# Patient Record
Sex: MALE | Race: OTHER | HISPANIC OR LATINO
[De-identification: names, ages, dates, MRNs, and addresses within clinical notes are randomized per-mention and may not be internally consistent; named-entity substitution may affect disease eponyms.]

---

## 2020-10-08 ENCOUNTER — TRANSCRIPTION ENCOUNTER (OUTPATIENT)
Age: 25
End: 2020-10-08

## 2020-10-09 ENCOUNTER — RESULT REVIEW (OUTPATIENT)
Age: 25
End: 2020-10-09

## 2020-10-09 ENCOUNTER — INPATIENT (INPATIENT)
Facility: HOSPITAL | Age: 25
LOS: 0 days | Discharge: HOME | End: 2020-10-10
Attending: SURGERY | Admitting: SURGERY
Payer: MEDICAID

## 2020-10-09 VITALS
HEIGHT: 67 IN | RESPIRATION RATE: 20 BRPM | OXYGEN SATURATION: 97 % | HEART RATE: 104 BPM | DIASTOLIC BLOOD PRESSURE: 84 MMHG | TEMPERATURE: 98 F | SYSTOLIC BLOOD PRESSURE: 147 MMHG | WEIGHT: 169.98 LBS

## 2020-10-09 DIAGNOSIS — E87.6 HYPOKALEMIA: ICD-10-CM

## 2020-10-09 DIAGNOSIS — K35.30 ACUTE APPENDICITIS WITH LOCALIZED PERITONITIS, WITHOUT PERFORATION OR GANGRENE: ICD-10-CM

## 2020-10-09 LAB
ALBUMIN SERPL ELPH-MCNC: 3.8 G/DL — SIGNIFICANT CHANGE UP (ref 3.3–5)
ALP SERPL-CCNC: 75 U/L — SIGNIFICANT CHANGE UP (ref 40–120)
ALT FLD-CCNC: 41 U/L — SIGNIFICANT CHANGE UP (ref 12–78)
ANION GAP SERPL CALC-SCNC: 7 MMOL/L — SIGNIFICANT CHANGE UP (ref 5–17)
APPEARANCE UR: CLEAR — SIGNIFICANT CHANGE UP
APTT BLD: 32.4 SEC — SIGNIFICANT CHANGE UP (ref 27.5–35.5)
AST SERPL-CCNC: 28 U/L — SIGNIFICANT CHANGE UP (ref 15–37)
BASOPHILS # BLD AUTO: 0.03 K/UL — SIGNIFICANT CHANGE UP (ref 0–0.2)
BASOPHILS NFR BLD AUTO: 0.3 % — SIGNIFICANT CHANGE UP (ref 0–2)
BILIRUB SERPL-MCNC: 0.4 MG/DL — SIGNIFICANT CHANGE UP (ref 0.2–1.2)
BILIRUB UR-MCNC: NEGATIVE — SIGNIFICANT CHANGE UP
BLD GP AB SCN SERPL QL: SIGNIFICANT CHANGE UP
BUN SERPL-MCNC: 13 MG/DL — SIGNIFICANT CHANGE UP (ref 7–23)
CALCIUM SERPL-MCNC: 8.8 MG/DL — SIGNIFICANT CHANGE UP (ref 8.5–10.1)
CHLORIDE SERPL-SCNC: 107 MMOL/L — SIGNIFICANT CHANGE UP (ref 96–108)
CO2 SERPL-SCNC: 26 MMOL/L — SIGNIFICANT CHANGE UP (ref 22–31)
COLOR SPEC: YELLOW — SIGNIFICANT CHANGE UP
CREAT SERPL-MCNC: 1.1 MG/DL — SIGNIFICANT CHANGE UP (ref 0.5–1.3)
DIFF PNL FLD: NEGATIVE — SIGNIFICANT CHANGE UP
EOSINOPHIL # BLD AUTO: 0.49 K/UL — SIGNIFICANT CHANGE UP (ref 0–0.5)
EOSINOPHIL NFR BLD AUTO: 4.1 % — SIGNIFICANT CHANGE UP (ref 0–6)
GLUCOSE SERPL-MCNC: 122 MG/DL — HIGH (ref 70–99)
GLUCOSE UR QL: NEGATIVE MG/DL — SIGNIFICANT CHANGE UP
HCT VFR BLD CALC: 47.4 % — SIGNIFICANT CHANGE UP (ref 39–50)
HGB BLD-MCNC: 16.4 G/DL — SIGNIFICANT CHANGE UP (ref 13–17)
IMM GRANULOCYTES NFR BLD AUTO: 0.3 % — SIGNIFICANT CHANGE UP (ref 0–1.5)
INR BLD: 1.36 RATIO — HIGH (ref 0.88–1.16)
KETONES UR-MCNC: NEGATIVE — SIGNIFICANT CHANGE UP
LEUKOCYTE ESTERASE UR-ACNC: NEGATIVE — SIGNIFICANT CHANGE UP
LIDOCAIN IGE QN: 128 U/L — SIGNIFICANT CHANGE UP (ref 73–393)
LYMPHOCYTES # BLD AUTO: 1.22 K/UL — SIGNIFICANT CHANGE UP (ref 1–3.3)
LYMPHOCYTES # BLD AUTO: 10.3 % — LOW (ref 13–44)
MCHC RBC-ENTMCNC: 30.1 PG — SIGNIFICANT CHANGE UP (ref 27–34)
MCHC RBC-ENTMCNC: 34.6 GM/DL — SIGNIFICANT CHANGE UP (ref 32–36)
MCV RBC AUTO: 87.1 FL — SIGNIFICANT CHANGE UP (ref 80–100)
MONOCYTES # BLD AUTO: 0.76 K/UL — SIGNIFICANT CHANGE UP (ref 0–0.9)
MONOCYTES NFR BLD AUTO: 6.4 % — SIGNIFICANT CHANGE UP (ref 2–14)
NEUTROPHILS # BLD AUTO: 9.35 K/UL — HIGH (ref 1.8–7.4)
NEUTROPHILS NFR BLD AUTO: 78.6 % — HIGH (ref 43–77)
NITRITE UR-MCNC: NEGATIVE — SIGNIFICANT CHANGE UP
NRBC # BLD: 0 /100 WBCS — SIGNIFICANT CHANGE UP (ref 0–0)
PH UR: 6.5 — SIGNIFICANT CHANGE UP (ref 5–8)
PLATELET # BLD AUTO: 269 K/UL — SIGNIFICANT CHANGE UP (ref 150–400)
POTASSIUM SERPL-MCNC: 3.4 MMOL/L — LOW (ref 3.5–5.3)
POTASSIUM SERPL-SCNC: 3.4 MMOL/L — LOW (ref 3.5–5.3)
PROT SERPL-MCNC: 7 GM/DL — SIGNIFICANT CHANGE UP (ref 6–8.3)
PROT UR-MCNC: NEGATIVE MG/DL — SIGNIFICANT CHANGE UP
PROTHROM AB SERPL-ACNC: 15.5 SEC — HIGH (ref 10.6–13.6)
RBC # BLD: 5.44 M/UL — SIGNIFICANT CHANGE UP (ref 4.2–5.8)
RBC # FLD: 12.3 % — SIGNIFICANT CHANGE UP (ref 10.3–14.5)
SARS-COV-2 RNA SPEC QL NAA+PROBE: SIGNIFICANT CHANGE UP
SODIUM SERPL-SCNC: 140 MMOL/L — SIGNIFICANT CHANGE UP (ref 135–145)
SP GR SPEC: 1.01 — SIGNIFICANT CHANGE UP (ref 1.01–1.02)
UROBILINOGEN FLD QL: NEGATIVE MG/DL — SIGNIFICANT CHANGE UP
WBC # BLD: 11.88 K/UL — HIGH (ref 3.8–10.5)
WBC # FLD AUTO: 11.88 K/UL — HIGH (ref 3.8–10.5)

## 2020-10-09 PROCEDURE — 74177 CT ABD & PELVIS W/CONTRAST: CPT | Mod: 26,ME

## 2020-10-09 PROCEDURE — 99284 EMERGENCY DEPT VISIT MOD MDM: CPT

## 2020-10-09 PROCEDURE — G1004: CPT

## 2020-10-09 PROCEDURE — 99222 1ST HOSP IP/OBS MODERATE 55: CPT | Mod: 57

## 2020-10-09 PROCEDURE — 93010 ELECTROCARDIOGRAM REPORT: CPT

## 2020-10-09 PROCEDURE — 88304 TISSUE EXAM BY PATHOLOGIST: CPT | Mod: 26

## 2020-10-09 PROCEDURE — 71045 X-RAY EXAM CHEST 1 VIEW: CPT | Mod: 26

## 2020-10-09 PROCEDURE — 44970 LAPAROSCOPY APPENDECTOMY: CPT | Mod: AS

## 2020-10-09 PROCEDURE — 44970 LAPAROSCOPY APPENDECTOMY: CPT

## 2020-10-09 RX ORDER — SODIUM CHLORIDE 9 MG/ML
1000 INJECTION, SOLUTION INTRAVENOUS
Refills: 0 | Status: DISCONTINUED | OUTPATIENT
Start: 2020-10-09 | End: 2020-10-09

## 2020-10-09 RX ORDER — SODIUM CHLORIDE 9 MG/ML
1000 INJECTION, SOLUTION INTRAVENOUS
Refills: 0 | Status: DISCONTINUED | OUTPATIENT
Start: 2020-10-09 | End: 2020-10-10

## 2020-10-09 RX ORDER — POTASSIUM CHLORIDE 20 MEQ
10 PACKET (EA) ORAL ONCE
Refills: 0 | Status: COMPLETED | OUTPATIENT
Start: 2020-10-09 | End: 2020-10-09

## 2020-10-09 RX ORDER — MORPHINE SULFATE 50 MG/1
4 CAPSULE, EXTENDED RELEASE ORAL ONCE
Refills: 0 | Status: DISCONTINUED | OUTPATIENT
Start: 2020-10-09 | End: 2020-10-09

## 2020-10-09 RX ORDER — ONDANSETRON 8 MG/1
4 TABLET, FILM COATED ORAL ONCE
Refills: 0 | Status: COMPLETED | OUTPATIENT
Start: 2020-10-09 | End: 2020-10-09

## 2020-10-09 RX ORDER — HEPARIN SODIUM 5000 [USP'U]/ML
5000 INJECTION INTRAVENOUS; SUBCUTANEOUS EVERY 12 HOURS
Refills: 0 | Status: DISCONTINUED | OUTPATIENT
Start: 2020-10-09 | End: 2020-10-09

## 2020-10-09 RX ORDER — IOHEXOL 300 MG/ML
30 INJECTION, SOLUTION INTRAVENOUS ONCE
Refills: 0 | Status: COMPLETED | OUTPATIENT
Start: 2020-10-09 | End: 2020-10-09

## 2020-10-09 RX ORDER — HYDROMORPHONE HYDROCHLORIDE 2 MG/ML
0.5 INJECTION INTRAMUSCULAR; INTRAVENOUS; SUBCUTANEOUS
Refills: 0 | Status: DISCONTINUED | OUTPATIENT
Start: 2020-10-09 | End: 2020-10-09

## 2020-10-09 RX ORDER — MORPHINE SULFATE 50 MG/1
2 CAPSULE, EXTENDED RELEASE ORAL EVERY 6 HOURS
Refills: 0 | Status: DISCONTINUED | OUTPATIENT
Start: 2020-10-09 | End: 2020-10-10

## 2020-10-09 RX ORDER — ONDANSETRON 8 MG/1
4 TABLET, FILM COATED ORAL EVERY 6 HOURS
Refills: 0 | Status: DISCONTINUED | OUTPATIENT
Start: 2020-10-09 | End: 2020-10-10

## 2020-10-09 RX ORDER — ACETAMINOPHEN 500 MG
1000 TABLET ORAL ONCE
Refills: 0 | Status: COMPLETED | OUTPATIENT
Start: 2020-10-09 | End: 2020-10-09

## 2020-10-09 RX ORDER — OXYCODONE AND ACETAMINOPHEN 5; 325 MG/1; MG/1
1 TABLET ORAL EVERY 6 HOURS
Refills: 0 | Status: DISCONTINUED | OUTPATIENT
Start: 2020-10-09 | End: 2020-10-10

## 2020-10-09 RX ORDER — SODIUM CHLORIDE 9 MG/ML
1000 INJECTION INTRAMUSCULAR; INTRAVENOUS; SUBCUTANEOUS ONCE
Refills: 0 | Status: COMPLETED | OUTPATIENT
Start: 2020-10-09 | End: 2020-10-09

## 2020-10-09 RX ORDER — HEPARIN SODIUM 5000 [USP'U]/ML
5000 INJECTION INTRAVENOUS; SUBCUTANEOUS EVERY 12 HOURS
Refills: 0 | Status: DISCONTINUED | OUTPATIENT
Start: 2020-10-09 | End: 2020-10-10

## 2020-10-09 RX ORDER — HYDROMORPHONE HYDROCHLORIDE 2 MG/ML
1 INJECTION INTRAMUSCULAR; INTRAVENOUS; SUBCUTANEOUS
Refills: 0 | Status: DISCONTINUED | OUTPATIENT
Start: 2020-10-09 | End: 2020-10-09

## 2020-10-09 RX ORDER — PIPERACILLIN AND TAZOBACTAM 4; .5 G/20ML; G/20ML
3.38 INJECTION, POWDER, LYOPHILIZED, FOR SOLUTION INTRAVENOUS ONCE
Refills: 0 | Status: COMPLETED | OUTPATIENT
Start: 2020-10-09 | End: 2020-10-09

## 2020-10-09 RX ORDER — ONDANSETRON 8 MG/1
4 TABLET, FILM COATED ORAL ONCE
Refills: 0 | Status: DISCONTINUED | OUTPATIENT
Start: 2020-10-09 | End: 2020-10-09

## 2020-10-09 RX ADMIN — HYDROMORPHONE HYDROCHLORIDE 0.5 MILLIGRAM(S): 2 INJECTION INTRAMUSCULAR; INTRAVENOUS; SUBCUTANEOUS at 23:20

## 2020-10-09 RX ADMIN — MORPHINE SULFATE 4 MILLIGRAM(S): 50 CAPSULE, EXTENDED RELEASE ORAL at 14:11

## 2020-10-09 RX ADMIN — HYDROMORPHONE HYDROCHLORIDE 0.5 MILLIGRAM(S): 2 INJECTION INTRAMUSCULAR; INTRAVENOUS; SUBCUTANEOUS at 23:14

## 2020-10-09 RX ADMIN — SODIUM CHLORIDE 100 MILLILITER(S): 9 INJECTION, SOLUTION INTRAVENOUS at 22:44

## 2020-10-09 RX ADMIN — SODIUM CHLORIDE 125 MILLILITER(S): 9 INJECTION, SOLUTION INTRAVENOUS at 18:25

## 2020-10-09 RX ADMIN — Medication 1000 MILLIGRAM(S): at 23:00

## 2020-10-09 RX ADMIN — HYDROMORPHONE HYDROCHLORIDE 0.5 MILLIGRAM(S): 2 INJECTION INTRAMUSCULAR; INTRAVENOUS; SUBCUTANEOUS at 22:59

## 2020-10-09 RX ADMIN — MORPHINE SULFATE 4 MILLIGRAM(S): 50 CAPSULE, EXTENDED RELEASE ORAL at 18:25

## 2020-10-09 RX ADMIN — HYDROMORPHONE HYDROCHLORIDE 0.5 MILLIGRAM(S): 2 INJECTION INTRAMUSCULAR; INTRAVENOUS; SUBCUTANEOUS at 23:35

## 2020-10-09 RX ADMIN — ONDANSETRON 4 MILLIGRAM(S): 8 TABLET, FILM COATED ORAL at 14:11

## 2020-10-09 RX ADMIN — PIPERACILLIN AND TAZOBACTAM 200 GRAM(S): 4; .5 INJECTION, POWDER, LYOPHILIZED, FOR SOLUTION INTRAVENOUS at 18:24

## 2020-10-09 RX ADMIN — Medication 100 MILLIEQUIVALENT(S): at 18:50

## 2020-10-09 RX ADMIN — IOHEXOL 30 MILLILITER(S): 300 INJECTION, SOLUTION INTRAVENOUS at 14:11

## 2020-10-09 RX ADMIN — MORPHINE SULFATE 4 MILLIGRAM(S): 50 CAPSULE, EXTENDED RELEASE ORAL at 14:30

## 2020-10-09 RX ADMIN — SODIUM CHLORIDE 1000 MILLILITER(S): 9 INJECTION INTRAMUSCULAR; INTRAVENOUS; SUBCUTANEOUS at 14:11

## 2020-10-09 RX ADMIN — Medication 400 MILLIGRAM(S): at 22:44

## 2020-10-09 RX ADMIN — MORPHINE SULFATE 4 MILLIGRAM(S): 50 CAPSULE, EXTENDED RELEASE ORAL at 18:50

## 2020-10-09 NOTE — H&P ADULT - NSHPPHYSICALEXAM_GEN_ALL_CORE
Vital Signs Last 24 Hrs  T(C): 36.7 (09 Oct 2020 17:07), Max: 36.8 (09 Oct 2020 13:17)  T(F): 98 (09 Oct 2020 17:07), Max: 98.3 (09 Oct 2020 13:17)  HR: 93 (09 Oct 2020 17:07) (93 - 104)  BP: 127/72 (09 Oct 2020 17:07) (127/72 - 147/84)  BP(mean): --  RR: 18 (09 Oct 2020 17:07) (18 - 20)  SpO2: 98% (09 Oct 2020 17:07) (97% - 98%)    PHYSICAL EXAM:  Constitutional: WDWN male in NAD  Eyes: AICHA  ENMT: NC/AT- throat pink  Neck: Supple no masses  Back: no CVA T  Respiratory: CTA  Cardiovascular: RR S1S2 nl no M  Gastrointestinal: No scars- BS ++ soft with ROVSINGS + and RLQ tenderness with guarding and rebound   Extremities: no CCE

## 2020-10-09 NOTE — ED ADULT NURSE NOTE - NSIMPLEMENTINTERV_GEN_ALL_ED
Implemented All Universal Safety Interventions:  [place] to call system. Call bell, personal items and telephone within reach. Instruct patient to call for assistance. Room bathroom lighting operational. Non-slip footwear when patient is off stretcher. Physically safe environment: no spills, clutter or unnecessary equipment. Stretcher in lowest position, wheels locked, appropriate side rails in place.

## 2020-10-09 NOTE — ED ADULT TRIAGE NOTE - CHIEF COMPLAINT QUOTE
Pt c/o worsening abd pain today, has been getting worse x 1 month. Hx of this x 1 year, had an endoscopy, was given pills but ran out.

## 2020-10-09 NOTE — H&P ADULT - NSHPLABSRESULTS_GEN_ALL_CORE
LABS:                       16.4   11.88 )-----------( 269      ( 09 Oct 2020 14:36 )             47.4     10-09    140  |  107  |  13  ----------------------------<  122<H>  3.4<L>   |  26  |  1.10    Ca    8.8      09 Oct 2020 14:36  TPro  7.0  /  Alb  3.8  /  TBili  0.4  /  DBili  x   /  AST  28  /  ALT  41  /  AlkPhos  75  10-09  < from: CT Abdomen and Pelvis w/ Oral Cont and w/ IV Cont (10.09.20 @ 16:34) >    EXAM:  CT ABDOMEN AND PELVIS OC IC                            PROCEDURE DATE:  10/09/2020          INTERPRETATION:  CLINICAL INFORMATION: Right lower quadrant abdominal pain.    COMPARISON: None.    PROCEDURE:  CT of the Abdomen and Pelvis was performed with intravenous contrast.  Intravenous contrast: 90 ml Omnipaque 350. 10 ml discarded.  Oral contrast: positive contrast was administered.  Sagittal and coronal reformats were performed.    FINDINGS: A fluid distended appendix demonstrating mild wall thickening identified. An appendicolith is identified. No significant infiltration of the periappendiceal fat.    LOWER CHEST: Within normal limits.    LIVER: Within normal limits.  BILE DUCTS: Normal caliber.  GALLBLADDER: Within normal limits.  SPLEEN: Within normal limits.  PANCREAS: Within normal limits.  ADRENALS: Within normal limits.  KIDNEYS/URETERS: Within normal limits.    BLADDER: Within normal limits.  REPRODUCTIVE ORGANS: Prostate within normal limits.    BOWEL: Fecal material scattered throughout the colon. No evidence for mechanical bowel obstruction. No colonic wall thickening.  PERITONEUM: No free intraperitoneal air or fluid.  VESSELS: Within normal limits.  RETROPERITONEUM/LYMPH NODES: No lymphadenopathy.  ABDOMINAL WALL: Within normal limits.  BONES: No osseous fracture. Sclerotic focus right femoral head, likely bone island.    IMPRESSION:  A fluid distended appendix demonstrating mild wall thickening identified. An appendicolith is identified. Findings most consistent with early acute appendicitis.                KENNY JARQUIN M.D., ATTENDING RADIOLOGIST  This document has been electronically signed. Oct  9 2020  4:41PM    < end of copied text >

## 2020-10-09 NOTE — ED ADULT NURSE NOTE - CHPI ED NUR SYMPTOMS NEG
no chills/no dysuria/no diarrhea/no blood in stool/no burning urination/no hematuria/no abdominal distension/no fever

## 2020-10-09 NOTE — H&P ADULT - PROBLEM SELECTOR PLAN 1
OR- Laparoscopic appendectomy  IV FLuids, OF antibiotics, Rapid COVID pre op   Consent on chart   Clears and ADAT post op OR- Laparoscopic appendectomy  IV FLuids, IV antibiotics, Rapid COVID pre op   Consent on chart   Clears and ADAT post op

## 2020-10-09 NOTE — ED ADULT NURSE NOTE - PAIN: BODY LOCATION
Don't put cereal in bottle unless instructed by a doctor, feed to baby on a spoon.    Start one new food every 3 days.    Next visit is at 6 months.    In the next few weeks you may receive a [employer] Ganey survey or email regarding your most recent clinic visit with us.  Please take a few moments to accurately evaluate your visit. We strive to provide you with the best medical care. Your feedback will assist us in achieving this. Again, thank you for your time and we look forward to your next visit.       If we need to contact you regarding any test results, we will make 2 attempts to reach you at the number you have listed during your office visit today. If we are unable to reach you, a letter will be sent requesting you to contact the office and any further instructions will be mailed to you home.           abdomen

## 2020-10-09 NOTE — ED PROVIDER NOTE - PHYSICAL EXAMINATION
Gen: Alert, Well appearing. NAD    Head: NC, AT, PERRL, normal lids/conjunctiva   ENT: Bilateral TM WNL, patent oropharynx without erythema/exudate, uvula midline  Neck: supple, no tenderness/meningismus  Pulm: Bilateral clear BS, normal resp effort  CV: RRR, no M/R/G, +dist pulses   Abd: soft, RLQ tender, ND, +BS, no guarding/rebound tenderness  Mskel: no edema/erythema/cyanosis   Skin: no rash, no bruising  Neuro: AAOx3, no sensory/motor deficits, CN 2-12 intact

## 2020-10-09 NOTE — BRIEF OPERATIVE NOTE - NSICDXBRIEFPREOP_GEN_ALL_CORE_FT
PRE-OP DIAGNOSIS:  Acute appendicitis with localized peritonitis 09-Oct-2020 22:55:22  Cheryl Kirk

## 2020-10-09 NOTE — ED PROVIDER NOTE - OBJECTIVE STATEMENT
25yo male with pmh gastritis not on meds, presents with RLQ pain today with NV. denies hematuria, diarrhea, constipation, fever.     No fever/chills, No photophobia/eye pain/changes in vision, No ear pain/sore throat/dysphagia, No chest pain/palpitations, no SOB/cough, no wheeze/stridor, +abdominal pain, +N/V, no D, no dysuria/frequency/discharge, No neck/back pain, no rash, no changes in neurological status/function.

## 2020-10-09 NOTE — ED ADULT NURSE NOTE - OBJECTIVE STATEMENT
Pt AOx4, ambulatory, c/o abdominal pain x 1 year, feeling worst since yesterday. Pt denies hematuria, dysuria, recent travel, CP, SOB, cough, and/or sick contacts. No distress noted.

## 2020-10-09 NOTE — PRE-OP CHECKLIST - COMMENTS
Patients last meal was at 1000 this am. Oatmeal and a sandwich. Information received via  service. Jose # [id number]

## 2020-10-09 NOTE — H&P ADULT - HISTORY OF PRESENT ILLNESS
Pt is a 25 yo male with no significant PMH presented to the ED with several hours of RLQ pain 7/10 non radiating. Denies fever nausea or vomiting. Nl BM and passing flatus.  Last ate ~1030 am tolerated diet.     PMH- Gastritis  Meds- "for gastritis" does not know name  Allergies- NKDA  Soc last smoked cigarettes 1.5 years ago  No ETOH    ER MEDICATIONS  (STANDING):  heparin   Injectable 5000 Unit(s) SubCutaneous every 12 hours  lactated ringers. 1000 milliLiter(s) (125 mL/Hr) IV Continuous <Continuous>  morphine  - Injectable 4 milliGRAM(s) IV Push Once  piperacillin/tazobactam IVPB... 3.375 Gram(s) IV Intermittent once  potassium chloride  10 mEq/100 mL IVPB 10 milliEquivalent(s) IV Intermittent once    MEDICATIONS  (PRN):

## 2020-10-09 NOTE — ED PROVIDER NOTE - CADM POA CENTRAL LINE
[facility] Emergency Department  [address] [address]  Phone:  [phone]  Fax:  [phone]                                    Kwabena Richards   MRN: [id number]    Department:  [facility] Emergency Department   Date of Visit:  8/17/2020           After Visit Summary Signature Page    I have received my discharge instructions, and my questions have been answered. I have discussed any challenges I see with this plan with the nurse or doctor.    ..........................................................................................................................................  Patient/Patient Representative Signature      ..........................................................................................................................................  Patient Representative Print Name and Relationship to Patient    ..................................................               ................................................  Date                                   Time    ..........................................................................................................................................  Reviewed by Signature/Title    ...................................................              ..............................................  Date                                               Time          22EPIC Rev 08/18       
No

## 2020-10-10 ENCOUNTER — TRANSCRIPTION ENCOUNTER (OUTPATIENT)
Age: 25
End: 2020-10-10

## 2020-10-10 VITALS
HEART RATE: 80 BPM | TEMPERATURE: 98 F | OXYGEN SATURATION: 100 % | RESPIRATION RATE: 16 BRPM | SYSTOLIC BLOOD PRESSURE: 132 MMHG | DIASTOLIC BLOOD PRESSURE: 76 MMHG

## 2020-10-10 LAB
ANION GAP SERPL CALC-SCNC: 6 MMOL/L — SIGNIFICANT CHANGE UP (ref 5–17)
BASOPHILS # BLD AUTO: 0.03 K/UL — SIGNIFICANT CHANGE UP (ref 0–0.2)
BASOPHILS NFR BLD AUTO: 0.4 % — SIGNIFICANT CHANGE UP (ref 0–2)
BUN SERPL-MCNC: 7 MG/DL — SIGNIFICANT CHANGE UP (ref 7–23)
CALCIUM SERPL-MCNC: 8.8 MG/DL — SIGNIFICANT CHANGE UP (ref 8.5–10.1)
CHLORIDE SERPL-SCNC: 107 MMOL/L — SIGNIFICANT CHANGE UP (ref 96–108)
CO2 SERPL-SCNC: 27 MMOL/L — SIGNIFICANT CHANGE UP (ref 22–31)
CREAT SERPL-MCNC: 0.92 MG/DL — SIGNIFICANT CHANGE UP (ref 0.5–1.3)
CULTURE RESULTS: NO GROWTH — SIGNIFICANT CHANGE UP
EOSINOPHIL # BLD AUTO: 0.37 K/UL — SIGNIFICANT CHANGE UP (ref 0–0.5)
EOSINOPHIL NFR BLD AUTO: 5.5 % — SIGNIFICANT CHANGE UP (ref 0–6)
GLUCOSE SERPL-MCNC: 95 MG/DL — SIGNIFICANT CHANGE UP (ref 70–99)
HCT VFR BLD CALC: 46.7 % — SIGNIFICANT CHANGE UP (ref 39–50)
HGB BLD-MCNC: 16.2 G/DL — SIGNIFICANT CHANGE UP (ref 13–17)
IMM GRANULOCYTES NFR BLD AUTO: 0.3 % — SIGNIFICANT CHANGE UP (ref 0–1.5)
LYMPHOCYTES # BLD AUTO: 1.49 K/UL — SIGNIFICANT CHANGE UP (ref 1–3.3)
LYMPHOCYTES # BLD AUTO: 22.3 % — SIGNIFICANT CHANGE UP (ref 13–44)
MCHC RBC-ENTMCNC: 29.8 PG — SIGNIFICANT CHANGE UP (ref 27–34)
MCHC RBC-ENTMCNC: 34.7 GM/DL — SIGNIFICANT CHANGE UP (ref 32–36)
MCV RBC AUTO: 86 FL — SIGNIFICANT CHANGE UP (ref 80–100)
MONOCYTES # BLD AUTO: 0.75 K/UL — SIGNIFICANT CHANGE UP (ref 0–0.9)
MONOCYTES NFR BLD AUTO: 11.2 % — SIGNIFICANT CHANGE UP (ref 2–14)
NEUTROPHILS # BLD AUTO: 4.02 K/UL — SIGNIFICANT CHANGE UP (ref 1.8–7.4)
NEUTROPHILS NFR BLD AUTO: 60.3 % — SIGNIFICANT CHANGE UP (ref 43–77)
NRBC # BLD: 0 /100 WBCS — SIGNIFICANT CHANGE UP (ref 0–0)
PLATELET # BLD AUTO: 267 K/UL — SIGNIFICANT CHANGE UP (ref 150–400)
POTASSIUM SERPL-MCNC: 4 MMOL/L — SIGNIFICANT CHANGE UP (ref 3.5–5.3)
POTASSIUM SERPL-SCNC: 4 MMOL/L — SIGNIFICANT CHANGE UP (ref 3.5–5.3)
RBC # BLD: 5.43 M/UL — SIGNIFICANT CHANGE UP (ref 4.2–5.8)
RBC # FLD: 12.5 % — SIGNIFICANT CHANGE UP (ref 10.3–14.5)
SARS-COV-2 IGG SERPL QL IA: POSITIVE
SARS-COV-2 IGM SERPL IA-ACNC: 1.44 INDEX — HIGH
SODIUM SERPL-SCNC: 140 MMOL/L — SIGNIFICANT CHANGE UP (ref 135–145)
SPECIMEN SOURCE: SIGNIFICANT CHANGE UP
WBC # BLD: 6.68 K/UL — SIGNIFICANT CHANGE UP (ref 3.8–10.5)
WBC # FLD AUTO: 6.68 K/UL — SIGNIFICANT CHANGE UP (ref 3.8–10.5)

## 2020-10-10 RX ORDER — INFLUENZA VIRUS VACCINE 15; 15; 15; 15 UG/.5ML; UG/.5ML; UG/.5ML; UG/.5ML
0.5 SUSPENSION INTRAMUSCULAR ONCE
Refills: 0 | Status: COMPLETED | OUTPATIENT
Start: 2020-10-10 | End: 2020-10-10

## 2020-10-10 RX ADMIN — MORPHINE SULFATE 2 MILLIGRAM(S): 50 CAPSULE, EXTENDED RELEASE ORAL at 07:59

## 2020-10-10 RX ADMIN — OXYCODONE AND ACETAMINOPHEN 1 TABLET(S): 5; 325 TABLET ORAL at 13:30

## 2020-10-10 RX ADMIN — INFLUENZA VIRUS VACCINE 0.5 MILLILITER(S): 15; 15; 15; 15 SUSPENSION INTRAMUSCULAR at 12:26

## 2020-10-10 RX ADMIN — ONDANSETRON 4 MILLIGRAM(S): 8 TABLET, FILM COATED ORAL at 00:07

## 2020-10-10 RX ADMIN — MORPHINE SULFATE 2 MILLIGRAM(S): 50 CAPSULE, EXTENDED RELEASE ORAL at 08:15

## 2020-10-10 RX ADMIN — OXYCODONE AND ACETAMINOPHEN 1 TABLET(S): 5; 325 TABLET ORAL at 12:59

## 2020-10-10 RX ADMIN — SODIUM CHLORIDE 100 MILLILITER(S): 9 INJECTION, SOLUTION INTRAVENOUS at 05:05

## 2020-10-10 RX ADMIN — HEPARIN SODIUM 5000 UNIT(S): 5000 INJECTION INTRAVENOUS; SUBCUTANEOUS at 05:05

## 2020-10-10 NOTE — DISCHARGE NOTE PROVIDER - NSDCFUADDINST_GEN_ALL_CORE_FT
You may shower.  Allow water to run over abdomen and pat dry, do not rub. Steri-strips will fall off on their own.   Do not drive or operate heavy machinery when taking narcotic pain meds. You may also take cover-the-counter pain meds as needed.  Please seek medical attention if you experience new or worsening symptoms including but not limited to nausea, vomiting, fevers, chills, increased pain, drainage/bleeding from surgical site.

## 2020-10-10 NOTE — DISCHARGE NOTE PROVIDER - NSDCACTIVITY_GEN_ALL_CORE
No heavy lifting/straining/Walking - Indoors allowed/Do not drive or operate machinery/Showering allowed

## 2020-10-10 NOTE — DISCHARGE NOTE NURSING/CASE MANAGEMENT/SOCIAL WORK - NSDCPNINST_GEN_ALL_CORE
Pt verbalized understanding of discharge instructions, medications, s/s of complications to notify MD/return to the ER.  Written instructions given.

## 2020-10-10 NOTE — PROGRESS NOTE ADULT - SUBJECTIVE AND OBJECTIVE BOX
S/P Laparoscopic Appendectomy   24y year old Male seen and examined at bedside. Admits to abdominal pain, well controlled with medication. Denies chest pain, shortness of breath, nausea/vomiting.    Vital Signs Last 24 Hrs  T(F): 97.8 (10-10-20 @ 03:00), Max: 98.7 (10-09-20 @ 23:57)  HR: 84 (10-10-20 @ 03:00)  BP: 129/89 (10-10-20 @ 03:00)  RR: 18 (10-10-20 @ 03:00)  SpO2: 100% (10-10-20 @ 03:00)  Wt(kg): --   CAPILLARY BLOOD GLUCOSE          GENERAL: Alert, NAD  CHEST/LUNG: Clear to auscultation bilaterally, respirations nonlabored  HEART: +S1S2, regular rate and rhythm  ABDOMEN: soft, nondistended. Appropriate incisional tenderness. Dressings clean dry intact x 3 over trocar sites  EXTREMITIES:  no calf tenderness, no edema. Intermittent pneumatic compression devices b/l LE     24y old  Male s/p Laparoscopic Appendectomy secondary to Acute appendicitis with localized peritonitis, POD#1 with PMH No pertinent past medical history    - Discharge home after tolerating breakfast  - local wound care  - advance diet as tolerated   - f/u AM labs  - continue VTE prophylaxis heparin   - continue current management per medicine  - will discuss with surgical attending

## 2020-10-10 NOTE — DISCHARGE NOTE PROVIDER - CARE PROVIDER_API CALL
Rene Huynh (MD)  Surgery  Critical Care  [address] [place] [address]  Phone: [phone]  Fax: [phone]  Follow Up Time:

## 2020-10-10 NOTE — DISCHARGE NOTE PROVIDER - HOSPITAL COURSE
23 y/o male no significant PMH p/w RLQ pain, admitted with acute appendicitis.  Patient was made NPO, IVF, and brought to OR for laparoscopic appendectomy. Patient did well intra-op without complications. Diet was advanced and pain managed post-op. At time of discharge, patient was tolerating regular diet, hemodynamically stable, and pain was well-controlled. Patient instructed to follow up with surgeon outpatient in 7-10 days.

## 2020-10-13 LAB — SURGICAL PATHOLOGY STUDY: SIGNIFICANT CHANGE UP

## 2020-10-14 PROBLEM — Z78.9 OTHER SPECIFIED HEALTH STATUS: Chronic | Status: ACTIVE | Noted: 2020-10-09

## 2020-10-16 DIAGNOSIS — R10.9 UNSPECIFIED ABDOMINAL PAIN: ICD-10-CM

## 2020-10-16 DIAGNOSIS — K35.30 ACUTE APPENDICITIS WITH LOCALIZED PERITONITIS, WITHOUT PERFORATION OR GANGRENE: ICD-10-CM

## 2020-10-16 DIAGNOSIS — E87.6 HYPOKALEMIA: ICD-10-CM

## 2020-10-16 PROBLEM — Z00.00 ENCOUNTER FOR PREVENTIVE HEALTH EXAMINATION: Status: ACTIVE | Noted: 2020-10-16

## 2020-10-22 ENCOUNTER — APPOINTMENT (OUTPATIENT)
Dept: SURGERY | Facility: CLINIC | Age: 25
End: 2020-10-22
Payer: MEDICAID

## 2020-10-22 VITALS — WEIGHT: 168 LBS | SYSTOLIC BLOOD PRESSURE: 130 MMHG | DIASTOLIC BLOOD PRESSURE: 83 MMHG | TEMPERATURE: 97.8 F

## 2020-10-22 VITALS — HEIGHT: 66 IN | BODY MASS INDEX: 27.12 KG/M2

## 2020-10-22 PROCEDURE — 99024 POSTOP FOLLOW-UP VISIT: CPT

## 2020-10-22 NOTE — PHYSICAL EXAM
[Normal Breath Sounds] : Normal breath sounds [Normal Rate and Rhythm] : normal rate and rhythm [Abdominal Masses] : No abdominal masses [Abdomen Tenderness] : ~T ~M No abdominal tenderness [de-identified] : NAD [de-identified] : Soft, nontender, nondistended, incisions well healed.

## 2020-10-22 NOTE — ASSESSMENT
[FreeTextEntry1] : Mr. Ruizlvizures status post laparoscopic appendectomy. Overall recovered well.  phone used for interview. 10 PT ROS OTW negative. Denies pain, nausea, vomiting. Pathology reviewed and report provided. May return to office as needed.

## 2020-10-22 NOTE — REASON FOR VISIT
[Follow-Up: _____] : a [unfilled] follow-up visit [[employer] ] : provided by [employer]   [TWNoteComboBox1] : Japanese

## 2020-10-22 NOTE — HISTORY OF PRESENT ILLNESS
[de-identified] :  Saralvizures status post laparoscopic appendectomy  [de-identified] : Mr. Ruizlvizures status post laparoscopic appendectomy. Overall recovered well.  phone used for interview. 10 PT ROS OTW negative. Denies pain, nausea, vomiting. Pathology reviewed and report provided.

## 2021-05-31 NOTE — BRIEF OPERATIVE NOTE - NSICDXBRIEFPOSTOP_GEN_ALL_CORE_FT
Mali Carrillo RN Case Manager - Transitional Care Team
POST-OP DIAGNOSIS:  Acute appendicitis with localized peritonitis 09-Oct-2020 22:55:48  Cheryl Kirk

## 2024-01-30 NOTE — PATIENT PROFILE ADULT - NS PRO AD NO ADVANCE DIRECTIVE
Post-op Day 1 Anesthesia Post-op Note    Patient: Chi Weston  Procedure(s) Performed:   Anesthesia type: General    Patient location: ICU    Vital Last Value   Temperature 36.8 °C (98.2 °F) (01/30/24 1200)   Pulse 87 (01/30/24 1300)   Respiratory Rate (!) 21 (01/30/24 1427)   Non-Invasive   Blood Pressure 130/83 (01/29/24 0519)   Arterial  Blood Pressure 120/63 (01/30/24 1300)   Pulse Oximetry 96 % (01/30/24 1300)     Last 24 I/O:   Intake/Output Summary (Last 24 hours) at 1/30/2024 1451  Last data filed at 1/30/2024 1313  Gross per 24 hour   Intake 4267.61 ml   Output 1460 ml   Net 2807.61 ml     Post-op vital signs: stable  Level of consciousness: participates in exam, answers questions appropriately, and awake    Post-op pain: Adequate analgesia  Nausea: None  Airway patency: patent  Respiratory: unassisted  Cardiovascular: stable  Hydration: euvolemic    Post-op assessment: no apparent anesthetic complications  Complications: None.    Comments:      No